# Patient Record
Sex: FEMALE | Race: WHITE | URBAN - METROPOLITAN AREA
[De-identification: names, ages, dates, MRNs, and addresses within clinical notes are randomized per-mention and may not be internally consistent; named-entity substitution may affect disease eponyms.]

---

## 2021-04-21 ENCOUNTER — WEB ENCOUNTER (OUTPATIENT)
Dept: URBAN - METROPOLITAN AREA CLINIC 54 | Facility: CLINIC | Age: 76
End: 2021-04-21

## 2021-04-21 ENCOUNTER — OFFICE VISIT (OUTPATIENT)
Dept: URBAN - METROPOLITAN AREA CLINIC 54 | Facility: CLINIC | Age: 76
End: 2021-04-21
Payer: COMMERCIAL

## 2021-04-21 ENCOUNTER — DASHBOARD ENCOUNTERS (OUTPATIENT)
Age: 76
End: 2021-04-21

## 2021-04-21 DIAGNOSIS — K92.1 MELENA: ICD-10-CM

## 2021-04-21 DIAGNOSIS — R13.10 DYSPHAGIA: ICD-10-CM

## 2021-04-21 DIAGNOSIS — D50.0 IRON DEFICIENCY ANEMIA DUE TO CHRONIC BLOOD LOSS: ICD-10-CM

## 2021-04-21 PROCEDURE — 99213 OFFICE O/P EST LOW 20 MIN: CPT | Performed by: INTERNAL MEDICINE

## 2021-04-21 RX ORDER — IBUPROFEN 800 MG/1
TABLET ORAL
Qty: 0 | Refills: 0 | Status: DISCONTINUED | COMMUNITY
Start: 1900-01-01

## 2021-04-21 RX ORDER — VIT A/VIT C/VIT E/ZINC/COPPER 2148-113
AS DIRECTED TABLET ORAL
Status: ACTIVE | COMMUNITY

## 2021-04-21 RX ORDER — TRAZODONE HYDROCHLORIDE 50 MG/1
1 TABLET AT BEDTIME AS NEEDED TABLET, FILM COATED ORAL ONCE A DAY
Status: ACTIVE | COMMUNITY

## 2021-04-21 RX ORDER — SODIUM, POTASSIUM,MAG SULFATES 17.5-3.13G
354ML SOLUTION, RECONSTITUTED, ORAL ORAL
Qty: 354 MILLILITER | Refills: 0 | OUTPATIENT
Start: 2021-04-21 | End: 2021-04-22

## 2021-04-21 RX ORDER — LATANOPROST/PF 0.005 %
DROPS OPHTHALMIC (EYE)
Qty: 0 | Refills: 0 | Status: DISCONTINUED | COMMUNITY
Start: 1900-01-01

## 2021-04-21 RX ORDER — ALPRAZOLAM 0.5 MG/1
TABLET ORAL
Qty: 0 | Refills: 0 | Status: ACTIVE | COMMUNITY
Start: 1900-01-01

## 2021-04-21 RX ORDER — AZITHROMYCIN DIHYDRATE 500 MG/1
TABLET, FILM COATED ORAL
Qty: 0 | Refills: 0 | Status: ACTIVE | COMMUNITY
Start: 1900-01-01

## 2021-04-21 RX ORDER — FLUTICASONE FUROATE, UMECLIDINIUM BROMIDE AND VILANTEROL TRIFENATATE 200; 62.5; 25 UG/1; UG/1; UG/1
1 PUFF POWDER RESPIRATORY (INHALATION) ONCE A DAY
Status: DISCONTINUED | COMMUNITY

## 2021-04-21 RX ORDER — OXYBUTYNIN CHLORIDE 5 MG/1
TABLET ORAL
Qty: 0 | Refills: 0 | Status: DISCONTINUED | COMMUNITY
Start: 1900-01-01

## 2021-04-21 RX ORDER — OMEPRAZOLE 40 MG/1
CAPSULE, DELAYED RELEASE PELLETS ORAL
Qty: 0 | Refills: 0 | Status: ACTIVE | COMMUNITY
Start: 1900-01-01

## 2021-04-21 RX ORDER — GABAPENTIN 600 MG/1
TAKE 1 TABLET (600 MG) BY ORAL ROUTE 3 TIMES PER DAY TABLET, FILM COATED ORAL
Qty: 0 | Refills: 0 | Status: ACTIVE | COMMUNITY
Start: 1900-01-01

## 2021-04-21 RX ORDER — ETHAMBUTOL HYDROCHLORIDE 400 MG/1
TAKE 15 MG/KG BY ORAL ROUTE ONCE DAILY NOT TO EXCEED 2.5 GRAMS IN 24HRS TABLET ORAL 1
Qty: 0 | Refills: 0 | Status: ACTIVE | COMMUNITY
Start: 1900-01-01

## 2021-04-21 RX ORDER — RIFAMPIN 300 MG/1
CAPSULE ORAL
Qty: 0 | Refills: 0 | Status: ACTIVE | COMMUNITY
Start: 1900-01-01

## 2021-04-21 RX ORDER — VITAMIN A 2400 MCG
2 TABLETS CAPSULE ORAL ONCE A DAY
Status: ACTIVE | COMMUNITY

## 2021-04-21 RX ORDER — ISOSORBIDE DINITRATE 30 MG/1
1 TABLET TABLET ORAL TWICE A DAY
Status: ACTIVE | COMMUNITY

## 2021-04-21 RX ORDER — OXYBUTYNIN CHLORIDE 5 MG/1
1 TABLET TABLET, EXTENDED RELEASE ORAL TID
Status: ACTIVE | COMMUNITY

## 2021-04-21 RX ORDER — FLUTICASONE FUROATE, UMECLIDINIUM BROMIDE AND VILANTEROL TRIFENATATE 100; 62.5; 25 UG/1; UG/1; UG/1
1 PUFF POWDER RESPIRATORY (INHALATION) ONCE A DAY
Status: ACTIVE | COMMUNITY

## 2021-04-21 NOTE — PHYSICAL EXAM CHEST:
no lesions,  no deformities,  no traumatic injuries,  no significant scars are present,  chest wall non-tender,  no masses present, breathing is unlabored without accessory muscle use, normal breath sounds Pacemaker present

## 2021-04-21 NOTE — HPI-TODAY'S VISIT:
75-year-old female with iron deficiency anemia, recent blood transfusions required.  She had a period of black stools which have returned more toward her normal color although still dark while on oral iron.  She had some epigastric discomfort.  She continues to have dysphagia.  She was initially seen for this last year but these procedure delayed because of cardiac clearance delay.  She has seen a cardiologist and has had improvement in her cardiac symptoms with no further chest pain or shortness of breath.  Her bowels are fairly regular although dark.  Her last colonoscopy was at least 10 years ago.  She had previously had a endoscopy with dilation many years ago.  She is at lower weight than normal but has been at this low weight for a number of years.

## 2021-04-21 NOTE — HPI-OTHER HISTORIES
>> 2020 visit The patient is a 74 year old female, who presents on referral from Mello Ambrosio MD, for a gastroenterology evaluation for dysphagia. A copy of this document will be sent to the referring provider. She complains of dysphagia. The dysphagia began 30 years ago and occurs with solids. Her symptoms are intermittent and are stable. The dysphagia is improved with drinking water and worsened with eating fast. Additionally, the patient admits to heartburn and chest pain. Her heartburn is intermittent and has been present for 10 years. She notes relief with proton pump inhibitors and suggests it is aggrevated by eating late at night. Diagnostic Studies: She has undergone no recent radiographic imaging. Relevant procedures performed to date: an EGD and esophageal dilatation. The EGD was performed approximately 30 years ago, and revealed stricture. The patient underwent esophageal dilatation 30 years ago with improvement. Frequent self limited food impactions lasting up to 30 minutes, never requiring urgent EGD. 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 Hx intermittent chest pain relieved by NTG, pacemaker, prior CABG. No hx CHF. Bowels ok, colonoscopy within last 10 yeas.

## 2021-05-13 ENCOUNTER — OFFICE VISIT (OUTPATIENT)
Dept: URBAN - METROPOLITAN AREA MEDICAL CENTER 23 | Facility: MEDICAL CENTER | Age: 76
End: 2021-05-13
Payer: COMMERCIAL

## 2021-05-13 DIAGNOSIS — K22.2 ACQUIRED ESOPHAGEAL RING: ICD-10-CM

## 2021-05-13 DIAGNOSIS — D12.3 ADENOMA OF TRANSVERSE COLON: ICD-10-CM

## 2021-05-13 DIAGNOSIS — K31.89 ACQUIRED DEFORMITY OF DUODENUM: ICD-10-CM

## 2021-05-13 DIAGNOSIS — D12.8 ADENOMATOUS POLYP OF RECTUM: ICD-10-CM

## 2021-05-13 DIAGNOSIS — D50.9 ANEMIA, IRON DEFICIENCY: ICD-10-CM

## 2021-05-13 PROCEDURE — 45385 COLONOSCOPY W/LESION REMOVAL: CPT | Performed by: INTERNAL MEDICINE

## 2021-05-13 PROCEDURE — 43248 EGD GUIDE WIRE INSERTION: CPT | Performed by: INTERNAL MEDICINE

## 2021-05-13 PROCEDURE — 43239 EGD BIOPSY SINGLE/MULTIPLE: CPT | Performed by: INTERNAL MEDICINE

## 2021-05-20 ENCOUNTER — TELEPHONE ENCOUNTER (OUTPATIENT)
Dept: URBAN - METROPOLITAN AREA CLINIC 92 | Facility: CLINIC | Age: 76
End: 2021-05-20

## 2021-05-20 ENCOUNTER — LAB OUTSIDE AN ENCOUNTER (OUTPATIENT)
Dept: URBAN - METROPOLITAN AREA CLINIC 92 | Facility: CLINIC | Age: 76
End: 2021-05-20

## 2021-05-20 PROBLEM — 724556004: Status: ACTIVE | Noted: 2021-04-21

## 2021-07-19 ENCOUNTER — TELEPHONE ENCOUNTER (OUTPATIENT)
Dept: URBAN - METROPOLITAN AREA CLINIC 54 | Facility: CLINIC | Age: 76
End: 2021-07-19

## 2022-02-03 ENCOUNTER — TELEPHONE ENCOUNTER (OUTPATIENT)
Dept: URBAN - METROPOLITAN AREA CLINIC 54 | Facility: CLINIC | Age: 77
End: 2022-02-03

## 2022-02-08 ENCOUNTER — OFFICE VISIT (OUTPATIENT)
Dept: RURAL MEDICAL CENTER 3 | Facility: MEDICAL CENTER | Age: 77
End: 2022-02-08
Payer: COMMERCIAL

## 2022-02-08 DIAGNOSIS — R13.19 CERVICAL DYSPHAGIA: ICD-10-CM

## 2022-02-08 PROCEDURE — 43249 ESOPH EGD DILATION <30 MM: CPT | Performed by: INTERNAL MEDICINE
